# Patient Record
Sex: FEMALE | Race: WHITE
[De-identification: names, ages, dates, MRNs, and addresses within clinical notes are randomized per-mention and may not be internally consistent; named-entity substitution may affect disease eponyms.]

---

## 2020-09-27 ENCOUNTER — HOSPITAL ENCOUNTER (EMERGENCY)
Dept: HOSPITAL 56 - MW.ED | Age: 25
Discharge: HOME | End: 2020-09-27
Payer: COMMERCIAL

## 2020-09-27 DIAGNOSIS — L03.213: Primary | ICD-10-CM

## 2020-09-27 DIAGNOSIS — Z88.1: ICD-10-CM

## 2020-09-27 NOTE — EDM.PDOC
ED HPI GENERAL MEDICAL PROBLEM





- General


Chief Complaint: ENT Problem


Stated Complaint: RT EYE HURTS


Time Seen by Provider: 09/27/20 07:18


Source of Information: Reports: Patient


History Limitations: Reports: No Limitations





- History of Present Illness


INITIAL COMMENTS - FREE TEXT/NARRATIVE: 





25-year-old female presents with irritation below the right eye worsening for 2 

weeks.  She denies fever, chills, pain with ocular range of motion, eye pain, 

blurred vision, headache.  She recently moved here from [place] and does not have

a PCP.





ROS: A 10-point review of systems, other than pertinent positives and negatives 

as stated per HPI, is otherwise negative





Past medical history: No additional pertinent history


Past Surgical history: No additional pertinent history


Social history: No additional pertinent history


Family history: No additional pertinent history


________________________________________________________________________________





PHYSICAL EXAM





General: AOx4, GCS = 15, No distress


HEENT: dry mucous membrane, trace periorbital cellulitis, no exophthalmos, no 

pain with ocular range of motion, no injected conjunctive a, PERRLA


Neck: supple, no meningismus, no Kernig or Brudzinski


Cardiac: S1S2 RRR


Respiratory: CTAB, no crackles or rales, no wheezing


Abdomen: Soft, nontender, no rebound or guarding, nondistended, no pulsatile 

mass.


Back: nontender


Musculoskeletal: NVI distally, no deformity


Neuro: No focal deficits, CN 2 - 12 WNL.











- Related Data


                                    Allergies











Allergy/AdvReac Type Severity Reaction Status Date / Time


 


bacitracin Allergy  Other Verified 09/27/20 07:40





[From Neosporin     





([id number])]     


 


neomycin Allergy  Other Verified 09/27/20 07:40





[From Neosporin     





([id number])]     


 


polymyxin B Allergy  Other Verified 09/27/20 07:40





[From Neosporin     





([id number])]     











Home Meds: 


                                    Home Meds





Sulfamethoxazole/Trimethoprim [Bactrim Ds Tablet] 2 each PO BID #40 tablet 

09/27/20 [Rx]


cephALEXin [Keflex] 500 mg PO Q8H #30 cap 09/27/20 [Rx]











ED ROS ENT





- Review of Systems


Review Of Systems: Comprehensive ROS is negative, except as noted in HPI.





ED EXAM, ENT





- Physical Exam


Exam: See Below (see dictation)





Course





- Vital Signs


Last Recorded V/S: 


                                Last Vital Signs











Temp  96.8 F L  09/27/20 07:10


 


Pulse  87   09/27/20 07:10


 


Resp  16   09/27/20 07:10


 


BP  109/67   09/27/20 07:10


 


Pulse Ox  98   09/27/20 07:10














- Re-Assessments/Exams


Free Text/Narrative Re-Assessment/Exam: 





09/27/20 07:32


 I advised the patient to return to the ER for reevaluation if symptoms 

worsened, including fever, worsening pain, or any other worrisome symptoms. I 

instructed the patient to follow up with their PCP within 2-3 days.





________________________________________________________________________________





MEDICAL DECISION MAKING: I reviewed the patients past medical records, lab and 

radiographic findings. I discussed the case with the patient. My differential 

diagnosis included: Periorbital cellulitis, status, no suspicion for orbital 

cellulitis.  No fever or pain with ocular range of motion, I do not suspect 

orbital cellulitis.  She had trace swelling and tenderness below her right eye, 

no rash to suggest for shingles





Departure





- Departure


Time of Disposition: 07:33


Disposition: Home, Self-Care 01


Condition: Good


Clinical Impression: 


 Periorbital cellulitis of right eye








- Discharge Information


*PRESCRIPTION DRUG MONITORING PROGRAM REVIEWED*: Not Applicable


*COPY OF PRESCRIPTION DRUG MONITORING REPORT IN PATIENT ANIYA: Not Applicable


Prescriptions: 


Sulfamethoxazole/Trimethoprim [Bactrim Ds Tablet] 2 each PO BID #40 tablet


cephALEXin [Keflex] 500 mg PO Q8H #30 cap


Instructions:  Orbital Cellulitis


Referrals: 


PCP,None [Primary Care Provider] - 3 Days


Forms:  ED Department Discharge


Additional Instructions: 


The need for follow-up, as well as the timing and circumstances, are variable 

depending upon the specifics of your emergency department visit.





If you don't have a primary care physician on staff, we will provide you with a 

referral. We always advise you to contact your personal physician following an 

emergency department visit to inform them of the circumstance of the visit and 

for follow-up with them and/or the need for any referrals to a consulting 

specialist.





The emergency department will also refer you to a specialist when appropriate. 

This referral assures that you have the opportunity for follow-up care with a 

specialist. All of these measure are taken in an effort to provide you with 

optimal care, which includes your follow-up.





Under all circumstances we always encourage you to contact your private 

physician who remains a resource for coordinating your care. When calling for 

follow-up care, please make the office aware that this follow-up is from your 

recent emergency room visit. If for any reason you are refused follow-up, please

contact the [facility] Emergency Department

at [phone] and asked to speak to the emergency department charge nurse.





If you do not have a primary care doctor, please follow up with the clinics 

below within 3-5 days. 





[facility]


[address] [place] [id number]


Phone: [phone]


Fax: [phone]





[facility]


[address] [place] [id number]


Phone: [phone]


Fax: [phone]








Sepsis Event Note (ED)





- Focused Exam


Vital Signs: 


                                   Vital Signs











  Temp Pulse Resp BP Pulse Ox


 


 09/27/20 07:10  96.8 F L  87  16  109/67  98